# Patient Record
Sex: FEMALE | Race: BLACK OR AFRICAN AMERICAN | NOT HISPANIC OR LATINO | Employment: UNEMPLOYED | URBAN - METROPOLITAN AREA
[De-identification: names, ages, dates, MRNs, and addresses within clinical notes are randomized per-mention and may not be internally consistent; named-entity substitution may affect disease eponyms.]

---

## 2017-01-19 ENCOUNTER — HISTORICAL (OUTPATIENT)
Dept: SURGERY | Facility: HOSPITAL | Age: 67
End: 2017-01-19

## 2017-01-25 ENCOUNTER — HISTORICAL (OUTPATIENT)
Dept: ANESTHESIOLOGY | Facility: HOSPITAL | Age: 67
End: 2017-01-25

## 2017-03-08 ENCOUNTER — HISTORICAL (OUTPATIENT)
Dept: RADIOLOGY | Facility: HOSPITAL | Age: 67
End: 2017-03-08

## 2017-03-20 ENCOUNTER — HISTORICAL (OUTPATIENT)
Dept: SURGERY | Facility: HOSPITAL | Age: 67
End: 2017-03-20

## 2017-04-03 ENCOUNTER — HISTORICAL (OUTPATIENT)
Dept: ANESTHESIOLOGY | Facility: HOSPITAL | Age: 67
End: 2017-04-03

## 2017-04-11 ENCOUNTER — HISTORICAL (OUTPATIENT)
Dept: RADIOLOGY | Facility: HOSPITAL | Age: 67
End: 2017-04-11

## 2017-04-12 ENCOUNTER — HISTORICAL (OUTPATIENT)
Dept: RADIOLOGY | Facility: HOSPITAL | Age: 67
End: 2017-04-12

## 2017-05-02 ENCOUNTER — HISTORICAL (OUTPATIENT)
Dept: RADIOLOGY | Facility: HOSPITAL | Age: 67
End: 2017-05-02

## 2017-05-22 ENCOUNTER — HISTORICAL (OUTPATIENT)
Dept: SURGERY | Facility: HOSPITAL | Age: 67
End: 2017-05-22

## 2017-05-22 LAB
ALBUMIN SERPL-MCNC: 4 GM/DL (ref 3.4–5)
ALBUMIN/GLOB SERPL: 1 RATIO
ALP SERPL-CCNC: 25 UNIT/L (ref 30–113)
ALT SERPL-CCNC: 26 UNIT/L (ref 10–45)
ANISOCYTOSIS BLD QL SMEAR: ABNORMAL
APTT PPP: 26 SECOND(S) (ref 25–35)
AST SERPL-CCNC: 22 UNIT/L (ref 15–37)
BASOPHILS NFR BLD MANUAL: 0 % (ref 0–1)
BILIRUB SERPL-MCNC: 0.5 MG/DL (ref 0.1–0.9)
BILIRUBIN DIRECT+TOT PNL SERPL-MCNC: 0.1 MG/DL (ref 0–0.3)
BILIRUBIN DIRECT+TOT PNL SERPL-MCNC: 0.4 MG/DL
BUN SERPL-MCNC: 25 MG/DL (ref 10–20)
CALCIUM SERPL-MCNC: 9.5 MG/DL (ref 8–10.5)
CHLORIDE SERPL-SCNC: 100 MMOL/L (ref 100–108)
CO2 SERPL-SCNC: 32 MMOL/L (ref 21–35)
CREAT SERPL-MCNC: 1.52 MG/DL (ref 0.7–1.3)
EOSINOPHIL NFR BLD MANUAL: 1 % (ref 0–5)
ERYTHROCYTE [DISTWIDTH] IN BLOOD BY AUTOMATED COUNT: 12.7 % (ref 11.5–17)
GLOBULIN SER-MCNC: 4.2 GM/DL
GLUCOSE SERPL-MCNC: 94 MG/DL (ref 75–116)
GRANULOCYTES NFR BLD MANUAL: 46 % (ref 47–80)
HCT VFR BLD AUTO: 32.2 % (ref 36–48)
HGB BLD-MCNC: 10.6 GM/DL (ref 12–16)
HYPOCHROMIA BLD QL SMEAR: ABNORMAL
INR PPP: 1.1 (ref 0–1.2)
LYMPHOCYTES NFR BLD MANUAL: 38 % (ref 15–40)
MCH RBC QN AUTO: 29 PG (ref 27–34)
MCHC RBC AUTO-ENTMCNC: 33 GM/DL (ref 31–36)
MCV RBC AUTO: 89 FL (ref 80–99)
MONOCYTES NFR BLD MANUAL: 15 % (ref 4–12)
PLATELET # BLD AUTO: 200 X10(3)/MCL (ref 140–400)
PLATELET # BLD EST: ADEQUATE 10*3/UL
PMV BLD AUTO: 10.5 FL (ref 6.8–10)
POTASSIUM SERPL-SCNC: 4.1 MMOL/L (ref 3.6–5.2)
PROT SERPL-MCNC: 8.2 GM/DL (ref 6.4–8.2)
PROTHROMBIN TIME: 11.5 SECOND(S) (ref 9–12)
RBC # BLD AUTO: 3.6 X10(6)/MCL (ref 4.2–5.4)
SODIUM SERPL-SCNC: 138 MMOL/L (ref 135–145)
WBC # SPEC AUTO: 3.2 X10(3)/MCL (ref 4.5–11.5)

## 2017-05-29 ENCOUNTER — HISTORICAL (OUTPATIENT)
Dept: ANESTHESIOLOGY | Facility: HOSPITAL | Age: 67
End: 2017-05-29

## 2017-10-31 ENCOUNTER — HISTORICAL (OUTPATIENT)
Dept: RADIOLOGY | Facility: HOSPITAL | Age: 67
End: 2017-10-31

## 2018-03-06 ENCOUNTER — HISTORICAL (OUTPATIENT)
Dept: SURGERY | Facility: HOSPITAL | Age: 68
End: 2018-03-06

## 2018-03-06 LAB
ABS NEUT (OLG): 1.8 X10(3)/MCL (ref 1.5–6.9)
ALBUMIN SERPL-MCNC: 3.5 GM/DL (ref 3.4–5)
ALBUMIN/GLOB SERPL: 0.8 RATIO
ALP SERPL-CCNC: 23 UNIT/L (ref 30–113)
ALT SERPL-CCNC: 30 UNIT/L (ref 10–45)
AST SERPL-CCNC: 27 UNIT/L (ref 15–37)
BILIRUB SERPL-MCNC: 0.3 MG/DL (ref 0.1–0.9)
BILIRUBIN DIRECT+TOT PNL SERPL-MCNC: 0.1 MG/DL (ref 0–0.3)
BILIRUBIN DIRECT+TOT PNL SERPL-MCNC: 0.2 MG/DL
BUN SERPL-MCNC: 24 MG/DL (ref 10–20)
CALCIUM SERPL-MCNC: 9.2 MG/DL (ref 8–10.5)
CHLORIDE SERPL-SCNC: 103 MMOL/L (ref 100–108)
CO2 SERPL-SCNC: 32 MMOL/L (ref 21–35)
CREAT SERPL-MCNC: 1.01 MG/DL (ref 0.7–1.3)
ERYTHROCYTE [DISTWIDTH] IN BLOOD BY AUTOMATED COUNT: 13 % (ref 11.5–17)
GLOBULIN SER-MCNC: 4.3 GM/DL
GLUCOSE SERPL-MCNC: 96 MG/DL (ref 75–116)
HCT VFR BLD AUTO: 32.6 % (ref 36–48)
HGB BLD-MCNC: 10.3 GM/DL (ref 12–16)
MCH RBC QN AUTO: 29 PG (ref 27–34)
MCHC RBC AUTO-ENTMCNC: 32 GM/DL (ref 31–36)
MCV RBC AUTO: 92 FL (ref 80–99)
PLATELET # BLD AUTO: 236 X10(3)/MCL (ref 140–400)
PMV BLD AUTO: 10.8 FL (ref 6.8–10)
POTASSIUM SERPL-SCNC: 3.9 MMOL/L (ref 3.6–5.2)
PROT SERPL-MCNC: 7.8 GM/DL (ref 6.4–8.2)
RBC # BLD AUTO: 3.56 X10(6)/MCL (ref 4.2–5.4)
SODIUM SERPL-SCNC: 139 MMOL/L (ref 135–145)
WBC # SPEC AUTO: 3.9 X10(3)/MCL (ref 4.5–11.5)

## 2018-03-13 ENCOUNTER — HISTORICAL (OUTPATIENT)
Dept: ANESTHESIOLOGY | Facility: HOSPITAL | Age: 68
End: 2018-03-13

## 2018-11-13 ENCOUNTER — HISTORICAL (OUTPATIENT)
Dept: RADIOLOGY | Facility: HOSPITAL | Age: 68
End: 2018-11-13

## 2019-11-19 ENCOUNTER — HISTORICAL (OUTPATIENT)
Dept: RADIOLOGY | Facility: HOSPITAL | Age: 69
End: 2019-11-19

## 2020-09-22 ENCOUNTER — HISTORICAL (OUTPATIENT)
Dept: RADIOLOGY | Facility: HOSPITAL | Age: 70
End: 2020-09-22

## 2020-11-23 ENCOUNTER — HISTORICAL (OUTPATIENT)
Dept: RADIOLOGY | Facility: HOSPITAL | Age: 70
End: 2020-11-23

## 2021-09-27 ENCOUNTER — HISTORICAL (OUTPATIENT)
Dept: RADIOLOGY | Facility: HOSPITAL | Age: 71
End: 2021-09-27

## 2022-04-10 ENCOUNTER — HISTORICAL (OUTPATIENT)
Dept: ADMINISTRATIVE | Facility: HOSPITAL | Age: 72
End: 2022-04-10
Payer: COMMERCIAL

## 2022-04-11 ENCOUNTER — HISTORICAL (OUTPATIENT)
Dept: RADIOLOGY | Facility: HOSPITAL | Age: 72
End: 2022-04-11

## 2022-04-11 ENCOUNTER — HISTORICAL (OUTPATIENT)
Dept: ADMINISTRATIVE | Facility: HOSPITAL | Age: 72
End: 2022-04-11

## 2022-04-20 ENCOUNTER — HISTORICAL (OUTPATIENT)
Dept: RADIOLOGY | Facility: HOSPITAL | Age: 72
End: 2022-04-20
Payer: COMMERCIAL

## 2022-04-20 ENCOUNTER — HISTORICAL (OUTPATIENT)
Dept: ADMINISTRATIVE | Facility: HOSPITAL | Age: 72
End: 2022-04-20
Payer: COMMERCIAL

## 2022-04-21 DIAGNOSIS — E04.2 NONTOXIC MULTINODULAR GOITER: Primary | ICD-10-CM

## 2022-04-24 VITALS
HEIGHT: 63 IN | DIASTOLIC BLOOD PRESSURE: 68 MMHG | BODY MASS INDEX: 33.12 KG/M2 | OXYGEN SATURATION: 97 % | SYSTOLIC BLOOD PRESSURE: 136 MMHG | WEIGHT: 186.94 LBS

## 2022-04-30 NOTE — OP NOTE
Patient:   Marylou Escamilla             MRN: [id number]            FIN: [id number]               Age:   68 years     Sex:  Female     :  1950   Associated Diagnoses:   Encounter for screening colonoscopy   Author:   Mili Patel MD      Operative Note   Operative Information   Date/ Time:  3/13/2018 11:58:00.     Procedures Performed: Procedure Code   Colonoscopy, flexible; diagnostic, including collection of specimen(s) by brushing or washing, when performed (separate procedure) (65305)..     Indications: 68-year-old female with previous history of colonoscopy and polypectomy performed by piecemeal technique of the cecal/ascending colon in need of follow-up evaluation for complete removal a recurrence of polyp.  .     Preoperative Diagnosis: Encounter for screening colonoscopy ([id number]-AI Z12.11).     Postoperative Diagnosis: Encounter for screening colonoscopy ([id number] Z12.11).     Surgeon: Mili Patel MD.     Anesthesia: intravenous Mac.     Speciman Removed: none.     Description of Procedure/Findings/    Complications: procedure in detail: Patient was placed in left lateral decubitus position right side up and intravenous anesthesia was provided. Digital rectal exam performed exhibiting good anal rectal tone and no masses. An endoscope was then passed through the anus into main the rectum and with gentle insufflation reaching the cecum. Upon reaching the cecum pictures were taken. The cecum and ascending region with early investigative for remnants or recurrence of polyp or new polyps within the region. None were identified. The scope was slowly withdrawn the remainder of the ascending transverse descending and rectosigmoid colon all appeared to be grossly normal. The scope was retroflexed the distal rectum revealed small grade 1 internal hemorrhoids without signs of thrombosis ulceration or bleeding. The scope was returned to a neutral position and the colon was evacuated of air. The patient  was relieved anesthesia stable condition and transferred to postanesthesia care unit..     Esimated blood loss: No blood loss.     Findings: normal colonoscopy no identified remnants of previous polyp.     Complications: None.     Notes: recommend repeating colonoscopy in 7-10 years.

## 2022-04-30 NOTE — OP NOTE
ADMITTING DIAGNOSIS:  Chronic cholecystitis with a dysfunctional gallbladder, HIDA scan showing a 13% ejection fraction.    PROCEDURE:  Laparoscopic cholecystectomy with intraoperative cholangiography under fluoroscopy.    INDICATIONS:  The patient is a 67-year-old  female with a history of severe gastroesophageal reflux disease and had workup that demonstrated a dysfunctional gallbladder with a HIDA scan showing a 13% ejection fraction.  The patient was scheduled for cholecystectomy to see if it would relieve her symptoms.    DESCRIPTION OF PROCEDURE:  Patient was brought to the operating room, placed in supine position, general anesthetic.  Prepped and draped in a sterile fashion.  Had a supraumbilical incision made with insertion of Sonny needle, insufflation of abdomen of 14 mmHg with insertion of a 5 mm trocar.  The patient then underwent insertion of 2 lateral 5 mm trocars and one 5 mm trocar just to the right of falciform ligament.  Gallbladder was grasped and mobilized.  Cystic duct and artery were isolated.  Elma of [place] was clearly dissected.  Intraoperative cholangiogram was obtained.  The patient underwent clipping of the cystic duct and artery, cauterization of gallbladder off the liver bed, removal through the epigastric trocar site.  The 5 mm trocar site aponeurosis was closed with 0-Vicryl on a  needle.  Subcu was closed with 4-0 plain gut suture.  Dermabond was used for the skin.  Sterile dressings were applied.  No problems were encountered.  Patient tolerated the procedure well.  I appreciate the consultation referral from Dr. Vipin Franklin and will notify him of my findings.       It should be noted the patient also has a 3 cm hiatal hernia that I think would be reasonable to perform a laparoscopic Nissen fundoplication on.  I am going to check her symptoms and see     how she does.  If she still persists with gastroesophageal reflux disease and choking sensations  then I might consider Nissen fundoplication at a later date.        TWYLA/MODL   DD: 05/29/2017 0837   DT: 05/29/2017 0909  Job # [id number]    cc: Joce Franklin M.D.

## 2022-09-30 ENCOUNTER — HOSPITAL ENCOUNTER (OUTPATIENT)
Dept: RADIOLOGY | Facility: HOSPITAL | Age: 72
Discharge: HOME | End: 2022-09-30
Attending: FAMILY MEDICINE
Payer: COMMERCIAL

## 2022-09-30 DIAGNOSIS — Z12.31 BREAST CANCER SCREENING BY MAMMOGRAM: ICD-10-CM

## 2022-09-30 DIAGNOSIS — M81.0 OSTEOPOROSIS, POST-MENOPAUSAL: ICD-10-CM

## 2022-09-30 PROCEDURE — 77067 MAMMO DIGITAL SCREENING BILAT WITH TOMO: ICD-10-PCS | Mod: 26,,, | Performed by: RADIOLOGY

## 2022-09-30 PROCEDURE — 77063 BREAST TOMOSYNTHESIS BI: CPT | Mod: 26,,, | Performed by: RADIOLOGY

## 2022-09-30 PROCEDURE — 77067 SCR MAMMO BI INCL CAD: CPT | Mod: 26,,, | Performed by: RADIOLOGY

## 2022-09-30 PROCEDURE — 77080 DXA BONE DENSITY AXIAL: CPT | Mod: TC

## 2022-09-30 PROCEDURE — 77080 DEXA BONE DENSITY SPINE HIP: ICD-10-PCS | Mod: 26,,, | Performed by: RADIOLOGY

## 2022-09-30 PROCEDURE — 77080 DXA BONE DENSITY AXIAL: CPT | Mod: 26,,, | Performed by: RADIOLOGY

## 2022-09-30 PROCEDURE — 77067 SCR MAMMO BI INCL CAD: CPT | Mod: TC

## 2022-09-30 PROCEDURE — 77063 MAMMO DIGITAL SCREENING BILAT WITH TOMO: ICD-10-PCS | Mod: 26,,, | Performed by: RADIOLOGY

## 2022-10-20 ENCOUNTER — HOSPITAL ENCOUNTER (OUTPATIENT)
Dept: RADIOLOGY | Facility: HOSPITAL | Age: 72
Discharge: HOME | End: 2022-10-20
Attending: FAMILY MEDICINE
Payer: COMMERCIAL

## 2022-10-20 DIAGNOSIS — E04.2 NONTOXIC MULTINODULAR GOITER: ICD-10-CM

## 2022-10-20 PROCEDURE — 76536 US EXAM OF HEAD AND NECK: CPT | Mod: TC

## 2022-11-21 ENCOUNTER — HOSPITAL ENCOUNTER (OUTPATIENT)
Dept: RADIOLOGY | Facility: HOSPITAL | Age: 72
Discharge: HOME | End: 2022-11-21
Attending: FAMILY MEDICINE
Payer: COMMERCIAL

## 2022-11-21 DIAGNOSIS — M54.2 CERVICALGIA: ICD-10-CM

## 2022-11-21 PROCEDURE — 72040 X-RAY EXAM NECK SPINE 2-3 VW: CPT | Mod: TC

## 2022-11-22 DIAGNOSIS — M54.12 RADICULOPATHY, CERVICAL REGION: Primary | ICD-10-CM

## 2022-11-29 ENCOUNTER — HOSPITAL ENCOUNTER (OUTPATIENT)
Dept: RADIOLOGY | Facility: HOSPITAL | Age: 72
Discharge: HOME | End: 2022-11-29
Attending: FAMILY MEDICINE
Payer: COMMERCIAL

## 2022-11-29 DIAGNOSIS — M54.12 RADICULOPATHY, CERVICAL REGION: ICD-10-CM

## 2022-11-29 PROCEDURE — 72141 MRI NECK SPINE W/O DYE: CPT | Mod: TC

## 2023-09-28 DIAGNOSIS — Z12.31 ENCOUNTER FOR SCREENING MAMMOGRAM FOR MALIGNANT NEOPLASM OF BREAST: Primary | ICD-10-CM

## 2023-10-04 ENCOUNTER — HOSPITAL ENCOUNTER (OUTPATIENT)
Dept: RADIOLOGY | Facility: HOSPITAL | Age: 73
Discharge: HOME | End: 2023-10-04
Attending: FAMILY MEDICINE
Payer: COMMERCIAL

## 2023-10-04 DIAGNOSIS — Z12.31 ENCOUNTER FOR SCREENING MAMMOGRAM FOR MALIGNANT NEOPLASM OF BREAST: ICD-10-CM

## 2023-10-04 PROCEDURE — 77063 BREAST TOMOSYNTHESIS BI: CPT | Mod: 26,,, | Performed by: STUDENT IN AN ORGANIZED HEALTH CARE EDUCATION/TRAINING PROGRAM

## 2023-10-04 PROCEDURE — 77063 MAMMO DIGITAL SCREENING BILAT WITH TOMO: ICD-10-PCS | Mod: 26,,, | Performed by: STUDENT IN AN ORGANIZED HEALTH CARE EDUCATION/TRAINING PROGRAM

## 2023-10-04 PROCEDURE — 77067 SCR MAMMO BI INCL CAD: CPT | Mod: 26,,, | Performed by: STUDENT IN AN ORGANIZED HEALTH CARE EDUCATION/TRAINING PROGRAM

## 2023-10-04 PROCEDURE — 77067 MAMMO DIGITAL SCREENING BILAT WITH TOMO: ICD-10-PCS | Mod: 26,,, | Performed by: STUDENT IN AN ORGANIZED HEALTH CARE EDUCATION/TRAINING PROGRAM

## 2024-03-26 DIAGNOSIS — R01.1 CARDIAC MURMUR: Primary | ICD-10-CM

## 2024-04-04 ENCOUNTER — HOSPITAL ENCOUNTER (OUTPATIENT)
Dept: CARDIOLOGY | Facility: HOSPITAL | Age: 74
Discharge: HOME | End: 2024-04-04
Attending: FAMILY MEDICINE
Payer: COMMERCIAL

## 2024-04-04 DIAGNOSIS — R01.1 CARDIAC MURMUR: ICD-10-CM

## 2024-04-04 LAB
AV INDEX (PROSTH): 0.74
AV MEAN GRADIENT: 5 MMHG
AV PEAK GRADIENT: 10 MMHG
AV VALVE AREA BY VELOCITY RATIO: 2.72 CM²
AV VALVE AREA: 2.58 CM²
AV VELOCITY RATIO: 0.79
CV ECHO LV RWT: 0.42 CM
DOP CALC AO PEAK VEL: 1.59 M/S
DOP CALC AO VTI: 37.2 CM
DOP CALC LVOT AREA: 3.5 CM2
DOP CALC LVOT DIAMETER: 2.1 CM
DOP CALC LVOT PEAK VEL: 1.25 M/S
DOP CALC LVOT STROKE VOLUME: 95.89 CM3
DOP CALC MV VTI: 47.1 CM
DOP CALCLVOT PEAK VEL VTI: 27.7 CM
E WAVE DECELERATION TIME: 211 MSEC
E/A RATIO: 0.97
E/E' RATIO: 9.27 M/S
ECHO LV POSTERIOR WALL: 0.96 CM (ref 0.6–1.1)
FRACTIONAL SHORTENING: 34 % (ref 28–44)
HR MV ECHO: 54 BPM
INTERVENTRICULAR SEPTUM: 0.98 CM (ref 0.6–1.1)
LEFT ATRIUM SIZE: 3.9 CM
LEFT ATRIUM VOLUME MOD: 60.5 CM3
LEFT INTERNAL DIMENSION IN SYSTOLE: 3.03 CM (ref 2.1–4)
LEFT VENTRICLE DIASTOLIC VOLUME: 96.8 ML
LEFT VENTRICLE SYSTOLIC VOLUME: 35.9 ML
LEFT VENTRICULAR INTERNAL DIMENSION IN DIASTOLE: 4.59 CM (ref 3.5–6)
LEFT VENTRICULAR MASS: 151.81 G
LV LATERAL E/E' RATIO: 8.5 M/S
LV SEPTAL E/E' RATIO: 10.2 M/S
LVOT MG: 3 MMHG
LVOT MV: 0.76 CM/S
MV MEAN GRADIENT: 2 MMHG
MV PEAK A VEL: 1.05 M/S
MV PEAK E VEL: 1.02 M/S
MV PEAK GRADIENT: 6 MMHG
MV STENOSIS PRESSURE HALF TIME: 79 MS
MV VALVE AREA BY CONTINUITY EQUATION: 2.04 CM2
MV VALVE AREA P 1/2 METHOD: 2.78 CM2
OHS LV EJECTION FRACTION SIMPSONS BIPLANE MOD: 65 %
PISA TR MAX VEL: 2.5 M/S
RA PRESSURE ESTIMATED: 3 MMHG
RV TB RVSP: 6 MMHG
SINUS: 3.5 CM
TDI LATERAL: 0.12 M/S
TDI SEPTAL: 0.1 M/S
TDI: 0.11 M/S
TR MAX PG: 25 MMHG
TRICUSPID ANNULAR PLANE SYSTOLIC EXCURSION: 2.53 CM
TV REST PULMONARY ARTERY PRESSURE: 28 MMHG

## 2024-04-04 PROCEDURE — 93306 TTE W/DOPPLER COMPLETE: CPT | Mod: 26,,, | Performed by: INTERNAL MEDICINE

## 2024-04-04 PROCEDURE — 93306 TTE W/DOPPLER COMPLETE: CPT

## 2024-07-28 ENCOUNTER — HOSPITAL ENCOUNTER (EMERGENCY)
Facility: HOSPITAL | Age: 74
Discharge: HOME | End: 2024-07-28
Attending: INTERNAL MEDICINE
Payer: COMMERCIAL

## 2024-07-28 VITALS
HEIGHT: 64 IN | RESPIRATION RATE: 19 BRPM | BODY MASS INDEX: 32.44 KG/M2 | DIASTOLIC BLOOD PRESSURE: 85 MMHG | WEIGHT: 190 LBS | HEART RATE: 65 BPM | OXYGEN SATURATION: 98 % | SYSTOLIC BLOOD PRESSURE: 185 MMHG | TEMPERATURE: 99 F

## 2024-07-28 DIAGNOSIS — U07.1 COVID-19: ICD-10-CM

## 2024-07-28 DIAGNOSIS — J32.9 SINUSITIS, UNSPECIFIED CHRONICITY, UNSPECIFIED LOCATION: ICD-10-CM

## 2024-07-28 DIAGNOSIS — R05.9 COUGH, UNSPECIFIED TYPE: Primary | ICD-10-CM

## 2024-07-28 PROCEDURE — 99284 EMERGENCY DEPT VISIT MOD MDM: CPT | Mod: 25

## 2024-07-28 RX ORDER — ALBUTEROL SULFATE 90 UG/1
2 AEROSOL, METERED RESPIRATORY (INHALATION) EVERY 6 HOURS PRN
Qty: 18 G | Refills: 0 | Status: SHIPPED | OUTPATIENT
Start: 2024-07-28 | End: 2025-07-28

## 2024-07-28 RX ORDER — FLUTICASONE PROPIONATE 50 MCG
1 SPRAY, SUSPENSION (ML) NASAL 2 TIMES DAILY PRN
Qty: 15 G | Refills: 0 | Status: SHIPPED | OUTPATIENT
Start: 2024-07-28

## 2024-07-28 RX ORDER — AMOXICILLIN AND CLAVULANATE POTASSIUM 875; 125 MG/1; MG/1
1 TABLET, FILM COATED ORAL 2 TIMES DAILY
Qty: 14 TABLET | Refills: 0 | Status: SHIPPED | OUTPATIENT
Start: 2024-07-28

## 2024-07-28 RX ORDER — GUAIFENESIN 100 MG/5ML
100-200 SOLUTION ORAL EVERY 4 HOURS PRN
Qty: 60 ML | Refills: 0 | Status: SHIPPED | OUTPATIENT
Start: 2024-07-28 | End: 2024-08-07

## 2024-07-28 RX ORDER — AZITHROMYCIN 250 MG/1
TABLET, FILM COATED ORAL
Qty: 6 TABLET | Refills: 0 | Status: SHIPPED | OUTPATIENT
Start: 2024-07-28 | End: 2024-08-01

## 2024-07-29 NOTE — ED PROVIDER NOTES
Encounter Date: 7/28/2024       History     Chief Complaint   Patient presents with    Cough     C/o cough with post nasal drip x 1 week. Pt has been to urgent care and PCP, given cough meds, steroids, & inhaler, no improvement      74-year-old  female presents to the emergency room with postnasal drip, sore throat, and cough for the last week.  Patient recently diagnosed with COVID 1 week ago.  Denies nausea, vomiting, fever, chills, chest pain, or difficulty breathing.    The history is provided by the patient. No  was used.     Review of patient's allergies indicates:   Allergen Reactions    Latex Rash     Past Medical History:   Diagnosis Date    GERD (gastroesophageal reflux disease)     History of low potassium     Hypercholesterolemia     Hypertension      No past surgical history on file.  No family history on file.  Social History     Tobacco Use    Smoking status: Former     Types: Cigarettes    Smokeless tobacco: Never   Substance Use Topics    Alcohol use: Not Currently    Drug use: Never     Review of Systems   Constitutional: Negative.    HENT: Negative.     Eyes: Negative.    Respiratory: Negative.     Cardiovascular: Negative.    Gastrointestinal: Negative.    Genitourinary: Negative.    Musculoskeletal: Negative.    Neurological: Negative.        Physical Exam     Initial Vitals [07/28/24 1648]   BP Pulse Resp Temp SpO2   (!) 192/88 63 18 98.8 °F (37.1 °C) 97 %      MAP       --         Physical Exam    Constitutional: She appears well-developed and well-nourished.   HENT:   Right Ear: Tympanic membrane and external ear normal.   Left Ear: Tympanic membrane and external ear normal.   Nose: No mucosal edema or rhinorrhea.   Mouth/Throat: Mucous membranes are normal. Oropharyngeal exudate (post nasal drip) present. No posterior oropharyngeal edema.   Eyes: EOM are normal. Pupils are equal, round, and reactive to light. Right eye exhibits no discharge. Left eye  exhibits no discharge.   Neck: Neck supple.   Normal range of motion.  Cardiovascular:  Normal rate and regular rhythm.           Pulmonary/Chest: Breath sounds normal. No respiratory distress. She has no wheezes.   Abdominal: Abdomen is soft. Bowel sounds are normal. There is no abdominal tenderness.   Musculoskeletal:      Cervical back: Normal range of motion and neck supple.     Lymphadenopathy:        Head (right side): No submental and no tonsillar adenopathy present.        Head (left side): No submental and no tonsillar adenopathy present.     She has no cervical adenopathy.        Right cervical: No superficial cervical adenopathy present.       Left cervical: No superficial cervical adenopathy present.     She has no axillary adenopathy.   Psychiatric: She has a normal mood and affect. Her speech is normal and behavior is normal. Judgment and thought content normal. Cognition and memory are normal.         ED Course   Procedures  Labs Reviewed - No data to display       Imaging Results              X-Ray Chest AP Portable (In process)                      Medications - No data to display  Medical Decision Making  74-year-old  female presents to the emergency room with postnasal drip, sore throat, and cough for the last week.  Patient recently diagnosed with COVID 1 week ago.  Denies nausea, vomiting, fever, chills, chest pain, or difficulty breathing. Xray okay. Congestion noted- bronchitis. No fever. Sinusitis s/t COVID.     Problems Addressed:  Cough, unspecified type: acute illness or injury     Details: Differential diagnosis included but not limited to:  COVID, viral pharyngitis, URI, flu, pneumonia    Amount and/or Complexity of Data Reviewed  Radiology: ordered.    Risk  OTC drugs.  Prescription drug management.                                      Clinical Impression:  Final diagnoses:  [U07.1] COVID-19  [R05.9] Cough, unspecified type (Primary)  [J32.9] Sinusitis, unspecified  chronicity, unspecified location          ED Disposition Condition    Discharge Stable          ED Prescriptions       Medication Sig Dispense Start Date End Date Auth. Provider    guaiFENesin 100 mg/5 ml (ROBITUSSIN) 100 mg/5 mL syrup Take 5-10 mLs (100-200 mg total) by mouth every 4 (four) hours as needed for Cough. 60 mL 7/28/2024 8/7/2024 Mariel Davis PA    albuterol (PROVENTIL/VENTOLIN HFA) 90 mcg/actuation inhaler Inhale 2 puffs into the lungs every 6 (six) hours as needed for Wheezing. Rescue 18 g 7/28/2024 7/28/2025 Mariel Davis PA    amoxicillin-clavulanate 875-125mg (AUGMENTIN) 875-125 mg per tablet Take 1 tablet by mouth 2 (two) times daily. 14 tablet 7/28/2024 -- Mariel Davis PA    fluticasone propionate (FLONASE) 50 mcg/actuation nasal spray 1 spray (50 mcg total) by Each Nostril route 2 (two) times daily as needed for Rhinitis. 15 g 7/28/2024 -- Mariel Davis PA    azithromycin (ZITHROMAX Z-YAHIR) 250 MG tablet Take 2 tablets (500 mg total) by mouth once daily for 1 day, THEN 1 tablet (250 mg total) once daily for 4 days. 6 tablet 7/28/2024 8/1/2024 Mariel Davis PA          Follow-up Information       Follow up With Specialties Details Why Contact Info    Vicki Franklin MD Family Medicine Schedule an appointment as soon as possible for a visit in 1 day  [address] [address] [phone]      [facility] - Emergency Dept Emergency Medicine  As needed, If symptoms worsen [address] [facility] [address] [phone]             Mariel Davis PA  07/28/24 2686

## 2024-10-03 DIAGNOSIS — E04.1 NONTOXIC UNINODULAR GOITER: ICD-10-CM

## 2024-10-03 DIAGNOSIS — M85.88 OSTEOPENIA OF SPINE: ICD-10-CM

## 2024-10-03 DIAGNOSIS — Z12.31 ENCOUNTER FOR SCREENING MAMMOGRAM FOR BREAST CANCER: Primary | ICD-10-CM

## 2024-10-14 PROBLEM — Z12.11 ENCOUNTER FOR SCREENING COLONOSCOPY: Status: ACTIVE | Noted: 2024-10-14

## 2024-10-14 NOTE — H&P (VIEW-ONLY)
History & Physical    Subjective     History of Present Illness:  Patient is a 74 y.o. female referred by primary care physician for evaluation with screening colonoscopy.  Patient currently has 1 bowel movement per  1 day of normal caliber and size without noticing shrinkage or tapering.  Currently denying abdominal pain or cramping.  Denies unintentional weight loss.  Denies noticing blood in the stools.  Has no family history colon cancer.  Previous surgical history of admission cataract  Chief Complaint   Patient presents with    Surgical Consult     Dr. Franklin referral: Cscope/ hx colon polyps       Review of patient's allergies indicates:   Allergen Reactions    Latex Rash       Current Outpatient Medications   Medication Sig Dispense Refill    albuterol (PROVENTIL/VENTOLIN HFA) 90 mcg/actuation inhaler Inhale 2 puffs into the lungs every 6 (six) hours as needed for Wheezing. Rescue 18 g 0    amLODIPine (NORVASC) 10 MG tablet Take 10 mg by mouth once daily. for blood pressure      azelastine (ASTELIN) 137 mcg (0.1 %) nasal spray 1 spray by Nasal route 2 (two) times daily.      carvediloL (COREG) 12.5 MG tablet Take 12.5 mg by mouth 2 (two) times daily.      diclofenac sodium (VOLTAREN) 1 % Gel Apply 2 g topically 4 (four) times daily as needed.      ezetimibe (ZETIA) 10 mg tablet Take 10 mg by mouth every evening.      fluticasone propionate (FLONASE) 50 mcg/actuation nasal spray 1 spray (50 mcg total) by Each Nostril route 2 (two) times daily as needed for Rhinitis. 15 g 0    hydrALAZINE (APRESOLINE) 10 MG tablet Take 10 mg by mouth 2 (two) times daily.      losartan-hydrochlorothiazide 100-25 mg (HYZAAR) 100-25 mg per tablet Take 1 tablet by mouth once daily.      pantoprazole (PROTONIX) 40 MG tablet Take 40 mg by mouth once daily.      potassium chloride SA (K-DUR,KLOR-CON) 20 MEQ tablet Take 20 mEq by mouth once daily.      pravastatin (PRAVACHOL) 80 MG tablet Take 80 mg by mouth every  "evening.      PROLIA 60 mg/mL Syrg Inject 60 mg into the skin every 6 (six) months.       No current facility-administered medications for this visit.       Past Medical History:   Diagnosis Date    Carotid artery stenosis     CKD (chronic kidney disease)     Colon cancer screening     Essential (primary) hypertension     Folic acid deficiency     Gait disorder     slow    GERD (gastroesophageal reflux disease)     Hiatal hernia     History of colonic polyps     History of low potassium     Hypercholesterolemia     Hypertension     Impaired fasting glucose     Left ventricular hypertrophy     Mixed hyperlipidemia     Nontoxic multinodular goiter     RYLAND (obstructive sleep apnea)     Osteoporosis     Vitamin D deficiency      Past Surgical History:   Procedure Laterality Date    APPENDECTOMY  1987    Bilateral cataract surgery Bilateral 2011    COLONOSCOPY  03/13/2018    Dr Evangelist Patel    ESOPHAGOGASTRODUODENOSCOPY  02/2017    LAPAROSCOPIC CHOLECYSTECTOMY  05/29/2017    Dr Osman Pastor    TOTAL ABDOMINAL HYSTERECTOMY W/ BILATERAL SALPINGOOPHORECTOMY  1980     Family History   Problem Relation Name Age of Onset    Diabetes Mother      Coronary artery disease Mother      Other (amputation) Father      Diabetes Sister      Hyperlipidemia Other Daughter      Social History     Tobacco Use    Smoking status: Former     Types: Cigarettes    Smokeless tobacco: Never   Substance Use Topics    Alcohol use: Not Currently    Drug use: Never        Review of Systems:  Review of Systems   All other systems reviewed and are negative.       Objective     Vital Signs (Most Recent)  Temp: 98.5 °F (36.9 °C) (10/14/24 1427)  Pulse: (!) 57 (10/14/24 1427)  Resp: 20 (10/14/24 1427)  BP: (!) 152/68 (10/14/24 1427)  5' 4" (1.626 m)  86.6 kg (191 lb)     Physical Exam:  Physical Exam  Vitals reviewed.   Constitutional:       Appearance: Normal appearance.   HENT:      Head: Normocephalic and atraumatic. "      Nose: Nose normal.      Mouth/Throat:      Mouth: Mucous membranes are dry.   Eyes:      Extraocular Movements: Extraocular movements intact.      Pupils: Pupils are equal, round, and reactive to light.   Cardiovascular:      Rate and Rhythm: Normal rate and regular rhythm.   Pulmonary:      Effort: Pulmonary effort is normal.      Breath sounds: Normal breath sounds.   Abdominal:      General: Abdomen is flat.      Palpations: Abdomen is soft.   Musculoskeletal:         General: Normal range of motion.      Cervical back: Normal range of motion and neck supple.   Skin:     General: Skin is warm and dry.   Neurological:      General: No focal deficit present.      Mental Status: She is alert and oriented to person, place, and time.   Psychiatric:         Mood and Affect: Mood normal.     Laboratory  None    Diagnostic Results:  None       Assessment and Plan   74-year-old  female in need of interval appropriate screening colonoscopy with no family history of colon cancer.    PLAN:  Consent for colonoscopy has been obtained after risks benefits and alternatives to procedure explained and all questions answered.  Colonoscopy plan for 11/04/2024

## 2024-10-29 DIAGNOSIS — Z01.818 PREOP EXAMINATION: Primary | ICD-10-CM

## 2024-10-30 ENCOUNTER — CLINICAL SUPPORT (OUTPATIENT)
Dept: RESPIRATORY THERAPY | Facility: HOSPITAL | Age: 74
End: 2024-10-30
Attending: ANESTHESIOLOGY
Payer: COMMERCIAL

## 2024-10-30 DIAGNOSIS — Z01.818 PREOP EXAMINATION: ICD-10-CM

## 2024-10-30 PROCEDURE — 93005 ELECTROCARDIOGRAM TRACING: CPT

## 2024-10-30 PROCEDURE — 93010 ELECTROCARDIOGRAM REPORT: CPT | Mod: ,,, | Performed by: INTERNAL MEDICINE

## 2024-10-30 RX ORDER — ASPIRIN 81 MG/1
81 TABLET ORAL EVERY MORNING
COMMUNITY

## 2024-10-30 RX ORDER — FAMOTIDINE 40 MG/1
20 TABLET, FILM COATED ORAL 2 TIMES DAILY
COMMUNITY
Start: 2024-10-22

## 2024-10-30 NOTE — DISCHARGE INSTRUCTIONS
Follow prep on Sunday. Clear liquids only. Nothing by mouth after midnight. Take Amlodipine, Carvedilol, and Hydralazine AM of procedure with small sip of water.         INSTRUCTIONS  AFTER A COLONOSCOPY/EGD    NO DRIVING X 24 HOURS. NOTIFY YOUR DOCTOR WITH     ABDOMINAL PAIN UNRELIEVED BY  PASSING GAS,   FEVER WITHIN 24 HOURS, OR LARGE AMOUNT OF BLEEDING.

## 2024-10-31 LAB
OHS QRS DURATION: 90 MS
OHS QTC CALCULATION: 369 MS

## 2024-11-01 ENCOUNTER — ANESTHESIA EVENT (OUTPATIENT)
Dept: SURGERY | Facility: HOSPITAL | Age: 74
End: 2024-11-01
Payer: COMMERCIAL

## 2024-11-01 NOTE — ANESTHESIA PREPROCEDURE EVALUATION
11/01/2024  Marylou Escamilla is a 74 y.o., female.      Pre-op Assessment    I have reviewed the Patient Summary Reports.     I have reviewed the Nursing Notes. I have reviewed the NPO Status.   I have reviewed the Medications.     Review of Systems  Anesthesia Hx:             Denies Family Hx of Anesthesia complications.    Denies Personal Hx of Anesthesia complications.                    Social:  Former Smoker, No Alcohol Use       Hematology/Oncology:  Hematology Normal   Oncology Normal                                   EENT/Dental:  EENT/Dental Normal           Cardiovascular:     Hypertension, well controlled                                          Pulmonary:        Sleep Apnea                Renal/:  Chronic Renal Disease, CKD                Hepatic/GI:    Hiatal Hernia, GERD, well controlled                Musculoskeletal:  Musculoskeletal Normal                Neurological:    Neuromuscular Disease,                                   Endocrine:  Endocrine Normal            Dermatological:  Skin Normal    Psych:  Psychiatric Normal                    Physical Exam  General: Cooperative, Alert and Oriented    Airway:  Mallampati: II   Mouth Opening: Normal  TM Distance: Normal  Tongue: Normal  Neck ROM: Normal ROM    Dental:  Intact        Anesthesia Plan  Type of Anesthesia, risks & benefits discussed:    Anesthesia Type: Gen Natural Airway  Intra-op Monitoring Plan: Standard ASA Monitors  Post Op Pain Control Plan:   (medical reason for not using multimodal pain management)  Induction:  IV  Informed Consent: Informed consent signed with the Patient and all parties understand the risks and agree with anesthesia plan.  All questions answered. Patient consented to blood products? Yes  ASA Score: 3    Ready For Surgery From Anesthesia Perspective.     .

## 2024-11-04 ENCOUNTER — ANESTHESIA (OUTPATIENT)
Dept: SURGERY | Facility: HOSPITAL | Age: 74
End: 2024-11-04
Payer: COMMERCIAL

## 2024-11-04 ENCOUNTER — HOSPITAL ENCOUNTER (OUTPATIENT)
Facility: HOSPITAL | Age: 74
Discharge: HOME | End: 2024-11-04
Attending: SURGERY | Admitting: FAMILY MEDICINE
Payer: COMMERCIAL

## 2024-11-04 VITALS
SYSTOLIC BLOOD PRESSURE: 155 MMHG | HEART RATE: 55 BPM | HEIGHT: 64 IN | TEMPERATURE: 99 F | BODY MASS INDEX: 32.61 KG/M2 | OXYGEN SATURATION: 100 % | RESPIRATION RATE: 18 BRPM | DIASTOLIC BLOOD PRESSURE: 70 MMHG | WEIGHT: 191 LBS

## 2024-11-04 DIAGNOSIS — Z12.11 ENCOUNTER FOR SCREENING COLONOSCOPY: Primary | ICD-10-CM

## 2024-11-04 LAB
ANION GAP SERPL CALC-SCNC: 9 MEQ/L
BUN SERPL-MCNC: 12 MG/DL (ref 9.8–20.1)
CALCIUM SERPL-MCNC: 9.5 MG/DL (ref 8.4–10.2)
CHLORIDE SERPL-SCNC: 107 MMOL/L (ref 98–107)
CO2 SERPL-SCNC: 24 MMOL/L (ref 23–31)
CREAT SERPL-MCNC: 0.95 MG/DL (ref 0.55–1.02)
CREAT/UREA NIT SERPL: 13
GFR SERPLBLD CREATININE-BSD FMLA CKD-EPI: >60 ML/MIN/1.73/M2
GLUCOSE SERPL-MCNC: 95 MG/DL (ref 82–115)
HCT VFR BLD AUTO: 30.1 % (ref 37–47)
HGB BLD-MCNC: 9.9 G/DL (ref 12–16)
POTASSIUM SERPL-SCNC: 3.8 MMOL/L (ref 3.5–5.1)
SODIUM SERPL-SCNC: 140 MMOL/L (ref 136–145)

## 2024-11-04 PROCEDURE — 37000008 HC ANESTHESIA 1ST 15 MINUTES: Performed by: SURGERY

## 2024-11-04 PROCEDURE — 80048 BASIC METABOLIC PNL TOTAL CA: CPT | Performed by: ANESTHESIOLOGY

## 2024-11-04 PROCEDURE — 63600175 PHARM REV CODE 636 W HCPCS: Performed by: NURSE ANESTHETIST, CERTIFIED REGISTERED

## 2024-11-04 PROCEDURE — D9220A PRA ANESTHESIA: Mod: ,,, | Performed by: NURSE ANESTHETIST, CERTIFIED REGISTERED

## 2024-11-04 PROCEDURE — 37000009 HC ANESTHESIA EA ADD 15 MINS: Performed by: SURGERY

## 2024-11-04 PROCEDURE — G0121 COLON CA SCRN NOT HI RSK IND: HCPCS | Performed by: SURGERY

## 2024-11-04 PROCEDURE — 85018 HEMOGLOBIN: CPT | Performed by: ANESTHESIOLOGY

## 2024-11-04 PROCEDURE — 36415 COLL VENOUS BLD VENIPUNCTURE: CPT | Performed by: ANESTHESIOLOGY

## 2024-11-04 RX ORDER — ONDANSETRON HYDROCHLORIDE 2 MG/ML
4 INJECTION, SOLUTION INTRAVENOUS EVERY 12 HOURS PRN
Status: DISCONTINUED | OUTPATIENT
Start: 2024-11-04 | End: 2024-11-04 | Stop reason: HOSPADM

## 2024-11-04 RX ORDER — PROPOFOL 10 MG/ML
VIAL (ML) INTRAVENOUS
Status: DISCONTINUED | OUTPATIENT
Start: 2024-11-04 | End: 2024-11-04

## 2024-11-04 RX ORDER — SODIUM CHLORIDE 9 MG/ML
INJECTION, SOLUTION INTRAVENOUS CONTINUOUS
Status: DISCONTINUED | OUTPATIENT
Start: 2024-11-04 | End: 2024-11-04 | Stop reason: HOSPADM

## 2024-11-04 RX ORDER — FENTANYL CITRATE 50 UG/ML
INJECTION, SOLUTION INTRAMUSCULAR; INTRAVENOUS
Status: DISCONTINUED | OUTPATIENT
Start: 2024-11-04 | End: 2024-11-04

## 2024-11-04 RX ORDER — LIDOCAINE HYDROCHLORIDE 20 MG/ML
INJECTION INTRAVENOUS
Status: DISCONTINUED | OUTPATIENT
Start: 2024-11-04 | End: 2024-11-04

## 2024-11-04 RX ADMIN — PROPOFOL 100 MG: 10 INJECTION, EMULSION INTRAVENOUS at 07:11

## 2024-11-04 RX ADMIN — LIDOCAINE HYDROCHLORIDE 60 MG: 20 INJECTION, SOLUTION INTRAVENOUS at 07:11

## 2024-11-04 RX ADMIN — FENTANYL CITRATE 100 MCG: 50 INJECTION, SOLUTION INTRAMUSCULAR; INTRAVENOUS at 07:11

## 2024-11-04 NOTE — ANESTHESIA POSTPROCEDURE EVALUATION
Anesthesia Post Evaluation    Patient: Marylou Escamilla    Procedure(s) Performed: Procedure(s) (LRB):  COLONOSCOPY (N/A)    Final Anesthesia Type: general      Patient participation: Yes- Able to Participate  Level of consciousness: awake and alert  Post-procedure vital signs: reviewed and stable  Pain management: adequate  Airway patency: patent    PONV status at discharge: No PONV  Anesthetic complications: no      Cardiovascular status: blood pressure returned to baseline  Respiratory status: unassisted  Hydration status: euvolemic  Follow-up not needed.              Vitals Value Taken Time   /74 11/04/24 0636   Temp 36.9 °C (98.4 °F) 11/04/24 0636   Pulse 56 11/04/24 0636   Resp 20 11/04/24 0636   SpO2 100 % 11/04/24 0636         No case tracking events are documented in the log.      Pain/Fela Score: No data recorded

## 2024-11-04 NOTE — OP NOTE
Procedure date:  11/04/2024    Indications: 74 y.o. female In need of  screening colonoscopy with no family history colon cancer    Preoperative diagnosis:  Age-appropriate screening colonoscopy    Postoperative diagnosis:   1. Normal colonoscopy  2. Mild diverticulosis    Procedure performed:  Screening colonoscopy    Procedure in detail:  Patient was brought to the endoscopy suite laid in a left lateral decubitus position right side up.  Intravenous anesthesia was provided.  Digital rectal exam performed exhibiting good anorectal tone and no masses.  The endoscope was then passed through the anus intubating the rectum and with gentle insufflation reaching the cecum.  Upon reaching the cecum pictures are taken the scope was then slowly withdrawn.  Overall the cecum ascending transverse descending and rectosigmoid colon all appeared to be grossly normal without mass polyp or ulcerations seen.  A few scattered diverticula were noted within the rectosigmoid region.  Scope was then retroflexed in the distal rectum revealing normal-appearing perianal mucosa and no significant hemorrhoids.  It was then returned to neutral position and the colon was evacuated of air.  The patient was then relieved of anesthesia stable condition and transferred to postanesthesia care unit.    Specimens:  None    Findings:  Normal colonoscopy with mild diverticulosis    Complications:  None    Recommendations:  Repeat colonoscopy at a _10_ year interval    Disposition:  Upon recovery from anesthesia patient will be discharged to home with a follow up with___ primary care physician __________.    Mili Patel MD

## 2024-11-12 ENCOUNTER — HOSPITAL ENCOUNTER (OUTPATIENT)
Dept: RADIOLOGY | Facility: HOSPITAL | Age: 74
Discharge: HOME | End: 2024-11-12
Attending: FAMILY MEDICINE
Payer: COMMERCIAL

## 2024-11-12 DIAGNOSIS — M85.88 OSTEOPENIA OF SPINE: ICD-10-CM

## 2024-11-12 DIAGNOSIS — Z12.31 ENCOUNTER FOR SCREENING MAMMOGRAM FOR BREAST CANCER: ICD-10-CM

## 2024-11-12 PROCEDURE — 77080 DXA BONE DENSITY AXIAL: CPT | Mod: TC

## 2024-11-12 PROCEDURE — 77063 BREAST TOMOSYNTHESIS BI: CPT | Mod: TC

## 2024-11-19 ENCOUNTER — HOSPITAL ENCOUNTER (OUTPATIENT)
Dept: RADIOLOGY | Facility: HOSPITAL | Age: 74
Discharge: HOME | End: 2024-11-19
Attending: FAMILY MEDICINE
Payer: COMMERCIAL

## 2024-11-19 DIAGNOSIS — Z12.31 ENCOUNTER FOR SCREENING MAMMOGRAM FOR BREAST CANCER: ICD-10-CM

## 2024-11-19 PROCEDURE — 76536 US EXAM OF HEAD AND NECK: CPT | Mod: TC

## 2025-03-23 ENCOUNTER — HOSPITAL ENCOUNTER (EMERGENCY)
Facility: HOSPITAL | Age: 75
Discharge: HOME | End: 2025-03-23
Attending: INTERNAL MEDICINE
Payer: COMMERCIAL

## 2025-03-23 VITALS
SYSTOLIC BLOOD PRESSURE: 150 MMHG | HEIGHT: 64 IN | RESPIRATION RATE: 18 BRPM | BODY MASS INDEX: 32.1 KG/M2 | WEIGHT: 188 LBS | TEMPERATURE: 99 F | OXYGEN SATURATION: 97 % | HEART RATE: 52 BPM | DIASTOLIC BLOOD PRESSURE: 63 MMHG

## 2025-03-23 DIAGNOSIS — R04.0 EPISTAXIS: Primary | ICD-10-CM

## 2025-03-23 PROCEDURE — 99282 EMERGENCY DEPT VISIT SF MDM: CPT

## 2025-03-23 RX ORDER — VITAMIN A 3000 MCG
1 CAPSULE ORAL
Qty: 60 ML | Refills: 12 | Status: SHIPPED | OUTPATIENT
Start: 2025-03-23

## 2025-03-23 NOTE — DISCHARGE INSTRUCTIONS
Take medicines as prescribed    See your family doctor in one to 2 days for further evaluation, workup, and treatment as necessary    Avoid driving or operating machinery while taking medicines as some medicines might cause drowsiness and may cause problems. Also pain medicines have potential of being addictive  so use Pain meds specially Narcotics Sparingly.    The exam and treatment you received in Emergency Room was for an urgent problem and NOT INTENDED AS COMPLETE CARE. It is important that you FOLLOW UP with a doctor for ongoing care. If your symptoms become WORSE or you DO NOT IMPROVE and you are unable to reach your health care provider, you should RETURN to the emergency department. The Emergency Room doctor has provided a PRELIMINARY INTERPRETATION of all your STUDIES. A final interpretation may be done after you are discharged. IF A CHANGE in your diagnosis or treatment is needed WE WILL CONTACT YOU. It is critical that we have a CURRENT PHONE NUMBER FOR YOU.         See an ENT doctor for follow-up within a couple of days to evaluate, workup and treat as needed.    Take medicines as prescribed.    Avoid driving or operating any machinery as some medicines can cause drowsiness and sleepiness and can lead to accidents    Some of the local ENT are, Can call one of them to find out if they can see you.    [facility]  [address] [place] [address]  [phone]  [facility] 8A-4:30P  Fri 8A-12P    Chico Hodge MD  [address] [place]  [address]  [phone] [facility]    Bridger Stockton MD.  [address], [address]  [place]  [address] [phone] [facility]  [address] [place]  [address] [phone] [employer]

## 2025-03-23 NOTE — ED PROVIDER NOTES
Encounter Date: 3/23/2025  History from patient     History     Chief Complaint   Patient presents with    Epistaxis     C/o nosebleed last night and then again this morning with clots     HPI    Marylou Escamilla is 75 y.o. female who  has a past medical history of Carotid artery stenosis, CKD (chronic kidney disease), Colon cancer screening, Essential (primary) hypertension, Folic acid deficiency, Gait disorder, GERD (gastroesophageal reflux disease), Hiatal hernia, History of colonic polyps, History of low potassium, Hypercholesterolemia, Hypertension, Impaired fasting glucose, Left ventricular hypertrophy, Mixed hyperlipidemia, Nontoxic multinodular goiter, RYLAND (obstructive sleep apnea), Osteoporosis, and Vitamin D deficiency. arrives in ER with c/o Epistaxis (C/o nosebleed last night and then again this morning with clots)    Review of patient's allergies indicates:   Allergen Reactions    Latex Rash     Past Medical History:   Diagnosis Date    Carotid artery stenosis     CKD (chronic kidney disease)     Colon cancer screening     Essential (primary) hypertension     Folic acid deficiency     Gait disorder     slow    GERD (gastroesophageal reflux disease)     Hiatal hernia     History of colonic polyps     History of low potassium     Hypercholesterolemia     Hypertension     Impaired fasting glucose     Left ventricular hypertrophy     Mixed hyperlipidemia     Nontoxic multinodular goiter     RYLAND (obstructive sleep apnea)     Mild. No CPAP    Osteoporosis     Vitamin D deficiency      Past Surgical History:   Procedure Laterality Date    APPENDECTOMY  1987    Bilateral cataract surgery Bilateral 2011    COLONOSCOPY  03/13/2018    Dr Evangelist Patel    COLONOSCOPY N/A 11/4/2024    Procedure: COLONOSCOPY;  Surgeon: Mili Patel MD;  Location: [facility];  Service: Endoscopy;  Laterality: N/A;  POST OP: NORMAL    ESOPHAGOGASTRODUODENOSCOPY  02/2017    LAPAROSCOPIC CHOLECYSTECTOMY  05/29/2017    Dr Osman Pastor     TOTAL ABDOMINAL HYSTERECTOMY W/ BILATERAL SALPINGOOPHORECTOMY  1980     Family History   Problem Relation Name Age of Onset    Diabetes Mother      Coronary artery disease Mother      Other (amputation) Father      Diabetes Sister      Hyperlipidemia Other Daughter      Social History[1]  Review of Systems   Constitutional:  Negative for fever.   HENT:  Positive for nosebleeds. Negative for trouble swallowing and voice change.    Eyes:  Negative for visual disturbance.   Respiratory:  Negative for cough and shortness of breath.    Cardiovascular:  Negative for chest pain.   Gastrointestinal:  Negative for abdominal pain, diarrhea and vomiting.   Genitourinary:  Negative for dysuria and hematuria.   Musculoskeletal:  Negative for back pain and gait problem.   Skin:  Negative for color change and rash.   Neurological:  Negative for headaches.   Psychiatric/Behavioral:  Negative for behavioral problems and sleep disturbance.    All other systems reviewed and are negative.      Physical Exam     Initial Vitals [03/23/25 0834]   BP Pulse Resp Temp SpO2   (!) 150/63 (!) 52 18 98.5 °F (36.9 °C) 97 %      MAP       --         Physical Exam    Nursing note and vitals reviewed.  Constitutional: She appears well-developed.   HENT:   Head: Normocephalic and atraumatic. Mouth/Throat: Oropharynx is clear and moist.   Patient does not have any active bleeding at this time, she has some erosions on the turbinates.  She has needs 2 or 3 times in the emergency room but no blood.   Eyes: EOM are normal.   Cardiovascular:  Normal rate.           Pulmonary/Chest: Breath sounds normal.   Abdominal: Abdomen is soft.   Musculoskeletal:         General: No edema. Normal range of motion.     Neurological: She is alert.         ED Course   Procedures    Labs Reviewed - No data to display       Imaging Results    None          Medications - No data to display  Medical Decision Making  Patient with history of allergic rhinitis is on Astelin  nasal spray, she has been using it for the last 5 months, since last night she has been having recurrent nosebleed, so she was working all night and this morning when she got off she was bleeding from the nose so she came to the emergency room.  But the bleeding stopped on its own.    Patient has some erosion of the turbinates, but no other findings, she does not have any blood in the throat, she is not bleeding at this time, advised patient to avoid cleaning her nose too much, it stopped using the nasal spray for the time being I will prescribe her saline nasal spray to help it stop the dryness in the sinuses and she should give it 48 hours, she is taking aspirin and I have advised her to hold the aspirin also for the time being.    Advise her to come back to the emergency room in case she started bleeding again but pinched the nose and open the mouth and breathe through the mouth and we can re-evaluate if needed.    Risk  OTC drugs.                                      Clinical Impression:  Final diagnoses:  [R04.0] Epistaxis (Primary)          ED Disposition Condition    Discharge Stable          ED Prescriptions       Medication Sig Dispense Start Date End Date Auth. Provider    sodium chloride (SALINE NASAL) 0.65 % nasal spray 1 spray by Nasal route as needed for Congestion. 60 mL 3/23/2025 -- Simba Ohara MD          Follow-up Information       Follow up With Specialties Details Why Contact Info    Vicki Franklin MD Family Medicine In 2 days  [address] [place] [id number] [phone]               Simba Ohara MD  03/23/25 5243         [1]   Social History  Tobacco Use    Smoking status: Former     Types: Cigarettes    Smokeless tobacco: Never   Substance Use Topics    Alcohol use: Not Currently    Drug use: Never        Simba Ohara MD  03/23/25 5926

## 2025-04-08 DIAGNOSIS — N18.31 CHRONIC KIDNEY DISEASE, STAGE 3A: Primary | ICD-10-CM

## 2025-04-15 ENCOUNTER — HOSPITAL ENCOUNTER (OUTPATIENT)
Dept: RADIOLOGY | Facility: HOSPITAL | Age: 75
Discharge: HOME | End: 2025-04-15
Attending: FAMILY MEDICINE
Payer: COMMERCIAL

## 2025-04-15 DIAGNOSIS — N18.31 CHRONIC KIDNEY DISEASE, STAGE 3A: ICD-10-CM

## 2025-04-15 PROCEDURE — 93975 VASCULAR STUDY: CPT | Mod: TC

## 2025-06-03 ENCOUNTER — HOSPITAL ENCOUNTER (OUTPATIENT)
Dept: RADIOLOGY | Facility: HOSPITAL | Age: 75
Discharge: HOME | End: 2025-06-03
Attending: FAMILY MEDICINE
Payer: COMMERCIAL

## 2025-06-03 DIAGNOSIS — M25.561 RIGHT KNEE PAIN: ICD-10-CM

## 2025-06-03 PROCEDURE — 73562 X-RAY EXAM OF KNEE 3: CPT | Mod: TC,RT

## 2025-06-19 DIAGNOSIS — M23.91 DERANGEMENT OF RIGHT KNEE: Primary | ICD-10-CM

## 2025-06-25 ENCOUNTER — HOSPITAL ENCOUNTER (OUTPATIENT)
Dept: RADIOLOGY | Facility: HOSPITAL | Age: 75
Discharge: HOME | End: 2025-06-25
Attending: FAMILY MEDICINE
Payer: COMMERCIAL

## 2025-06-25 DIAGNOSIS — M23.91 DERANGEMENT OF RIGHT KNEE: ICD-10-CM

## 2025-06-25 PROCEDURE — 73721 MRI JNT OF LWR EXTRE W/O DYE: CPT | Mod: TC,RT

## 2025-08-13 ENCOUNTER — ANESTHESIA (OUTPATIENT)
Dept: SURGERY | Facility: HOSPITAL | Age: 75
End: 2025-08-13
Payer: COMMERCIAL

## 2025-08-13 ENCOUNTER — ANESTHESIA EVENT (OUTPATIENT)
Dept: SURGERY | Facility: HOSPITAL | Age: 75
End: 2025-08-13
Payer: COMMERCIAL

## 2025-08-13 ENCOUNTER — HOSPITAL ENCOUNTER (OUTPATIENT)
Facility: HOSPITAL | Age: 75
Discharge: HOME | End: 2025-08-13
Attending: SURGERY | Admitting: FAMILY MEDICINE
Payer: COMMERCIAL

## 2025-08-13 VITALS
DIASTOLIC BLOOD PRESSURE: 86 MMHG | TEMPERATURE: 98 F | BODY MASS INDEX: 30.8 KG/M2 | HEIGHT: 64 IN | OXYGEN SATURATION: 98 % | SYSTOLIC BLOOD PRESSURE: 119 MMHG | WEIGHT: 180.38 LBS | HEART RATE: 61 BPM | RESPIRATION RATE: 18 BRPM

## 2025-08-13 DIAGNOSIS — K92.1 HEMATOCHEZIA: Primary | ICD-10-CM

## 2025-08-13 DIAGNOSIS — Z86.0100 HISTORY OF COLON POLYPS: ICD-10-CM

## 2025-08-13 PROCEDURE — 63600175 PHARM REV CODE 636 W HCPCS: Performed by: NURSE ANESTHETIST, CERTIFIED REGISTERED

## 2025-08-13 PROCEDURE — 37000008 HC ANESTHESIA 1ST 15 MINUTES: Performed by: SURGERY

## 2025-08-13 PROCEDURE — 37000009 HC ANESTHESIA EA ADD 15 MINS: Performed by: SURGERY

## 2025-08-13 PROCEDURE — G0105 COLORECTAL SCRN; HI RISK IND: HCPCS | Performed by: SURGERY

## 2025-08-13 PROCEDURE — 63600175 PHARM REV CODE 636 W HCPCS: Performed by: SURGERY

## 2025-08-13 RX ORDER — LIDOCAINE HYDROCHLORIDE 10 MG/ML
INJECTION, SOLUTION EPIDURAL; INFILTRATION; INTRACAUDAL; PERINEURAL
Status: DISCONTINUED | OUTPATIENT
Start: 2025-08-13 | End: 2025-08-13

## 2025-08-13 RX ORDER — ONDANSETRON HYDROCHLORIDE 2 MG/ML
4 INJECTION, SOLUTION INTRAVENOUS EVERY 12 HOURS PRN
Status: DISCONTINUED | OUTPATIENT
Start: 2025-08-13 | End: 2025-08-13 | Stop reason: HOSPADM

## 2025-08-13 RX ORDER — SODIUM CHLORIDE 9 MG/ML
INJECTION, SOLUTION INTRAVENOUS CONTINUOUS
Status: CANCELLED | OUTPATIENT
Start: 2025-08-13

## 2025-08-13 RX ORDER — PROPOFOL 10 MG/ML
VIAL (ML) INTRAVENOUS
Status: DISCONTINUED | OUTPATIENT
Start: 2025-08-13 | End: 2025-08-13

## 2025-08-13 RX ADMIN — LIDOCAINE HYDROCHLORIDE 20 MG: 10 INJECTION, SOLUTION EPIDURAL; INFILTRATION; INTRACAUDAL; PERINEURAL at 07:08

## 2025-08-13 RX ADMIN — PROPOFOL 50 MG: 10 INJECTION, EMULSION INTRAVENOUS at 07:08

## 2025-08-13 RX ADMIN — ONDANSETRON HYDROCHLORIDE 4 MG: 2 SOLUTION INTRAMUSCULAR; INTRAVENOUS at 07:08

## 2025-08-13 RX ADMIN — PROPOFOL 100 MG: 10 INJECTION, EMULSION INTRAVENOUS at 07:08

## (undated) DEVICE — TUBE WATER AUXILIARY

## (undated) DEVICE — KIT SURGICAL COLON .25 1.1OZ

## (undated) DEVICE — TUBE SUCTION MEDI-VAC STERILE

## (undated) DEVICE — ADAPTER AIR/WATER CHANNEL CLEA

## (undated) DEVICE — LINER SUCTION KV 5 2000ML

## (undated) DEVICE — SPONGE COTTON TRAY 4X4IN